# Patient Record
Sex: MALE | Race: WHITE
[De-identification: names, ages, dates, MRNs, and addresses within clinical notes are randomized per-mention and may not be internally consistent; named-entity substitution may affect disease eponyms.]

---

## 2021-03-21 ENCOUNTER — HOSPITAL ENCOUNTER (INPATIENT)
Dept: HOSPITAL 75 - ER | Age: 36
LOS: 2 days | Discharge: ANOTHER HOSPITAL | DRG: 871 | End: 2021-03-23
Attending: FAMILY MEDICINE | Admitting: INTERNAL MEDICINE
Payer: SELF-PAY

## 2021-03-21 VITALS — BODY MASS INDEX: 38.67 KG/M2 | WEIGHT: 276.24 LBS | HEIGHT: 70.87 IN

## 2021-03-21 DIAGNOSIS — F17.210: ICD-10-CM

## 2021-03-21 DIAGNOSIS — R65.21: ICD-10-CM

## 2021-03-21 DIAGNOSIS — R45.1: ICD-10-CM

## 2021-03-21 DIAGNOSIS — R53.83: ICD-10-CM

## 2021-03-21 DIAGNOSIS — J96.01: ICD-10-CM

## 2021-03-21 DIAGNOSIS — E87.5: ICD-10-CM

## 2021-03-21 DIAGNOSIS — K72.90: ICD-10-CM

## 2021-03-21 DIAGNOSIS — N17.9: ICD-10-CM

## 2021-03-21 DIAGNOSIS — A41.9: Primary | ICD-10-CM

## 2021-03-21 DIAGNOSIS — Z20.822: ICD-10-CM

## 2021-03-21 DIAGNOSIS — E87.2: ICD-10-CM

## 2021-03-21 DIAGNOSIS — I42.9: ICD-10-CM

## 2021-03-21 DIAGNOSIS — F15.90: ICD-10-CM

## 2021-03-21 DIAGNOSIS — F12.90: ICD-10-CM

## 2021-03-21 DIAGNOSIS — J18.9: ICD-10-CM

## 2021-03-21 LAB
ALBUMIN SERPL-MCNC: 2.9 GM/DL (ref 3.2–4.5)
ALP SERPL-CCNC: 127 U/L (ref 40–136)
ALT SERPL-CCNC: 248 U/L (ref 0–55)
APTT BLD: 29 SEC (ref 24–35)
ARTERIAL PATENCY WRIST A: (no result)
BASE EXCESS STD BLDA CALC-SCNC: -4.9 MMOL/L (ref -2.5–2.5)
BASOPHILS # BLD AUTO: 0.1 10^3/UL (ref 0–0.1)
BASOPHILS NFR BLD AUTO: 1 % (ref 0–10)
BDY SITE: (no result)
BILIRUB SERPL-MCNC: 1.2 MG/DL (ref 0.1–1)
BODY TEMPERATURE: 36.1
BUN/CREAT SERPL: 12
CALCIUM SERPL-MCNC: 8.3 MG/DL (ref 8.5–10.1)
CHLORIDE SERPL-SCNC: 104 MMOL/L (ref 98–107)
CO2 BLDA CALC-SCNC: 19 MMOL/L (ref 21–31)
CO2 SERPL-SCNC: 17 MMOL/L (ref 21–32)
CREAT SERPL-MCNC: 1.42 MG/DL (ref 0.6–1.3)
EOSINOPHIL # BLD AUTO: 0.2 10^3/UL (ref 0–0.3)
EOSINOPHIL NFR BLD AUTO: 2 % (ref 0–10)
GFR SERPLBLD BASED ON 1.73 SQ M-ARVRAT: 56 ML/MIN
GLUCOSE SERPL-MCNC: 115 MG/DL (ref 70–105)
HCT VFR BLD CALC: 43 % (ref 40–54)
HGB BLD-MCNC: 13.4 G/DL (ref 13.3–17.7)
INHALED O2 FLOW RATE: (no result) L/MIN
INR PPP: 1.3 (ref 0.8–1.4)
LYMPHOCYTES # BLD AUTO: 2.4 10^3/UL (ref 1–4)
LYMPHOCYTES NFR BLD AUTO: 24 % (ref 12–44)
MANUAL DIFFERENTIAL PERFORMED BLD QL: NO
MCH RBC QN AUTO: 29 PG (ref 25–34)
MCHC RBC AUTO-ENTMCNC: 31 G/DL (ref 32–36)
MCV RBC AUTO: 93 FL (ref 80–99)
MONOCYTES # BLD AUTO: 1.1 10^3/UL (ref 0–1)
MONOCYTES NFR BLD AUTO: 11 % (ref 0–12)
NEUTROPHILS # BLD AUTO: 6.3 10^3/UL (ref 1.8–7.8)
NEUTROPHILS NFR BLD AUTO: 62 % (ref 42–75)
PCO2 BLDA: 25 MMHG (ref 35–45)
PH BLDA: 7.48 [PH] (ref 7.37–7.43)
PLATELET # BLD: 323 10^3/UL (ref 130–400)
PMV BLD AUTO: 9.9 FL (ref 9–12.2)
PO2 BLDA: 70 MMHG (ref 79–93)
POTASSIUM SERPL-SCNC: 4.8 MMOL/L (ref 3.6–5)
PROT SERPL-MCNC: 6.2 GM/DL (ref 6.4–8.2)
PROTHROMBIN TIME: 16.2 SEC (ref 12.2–14.7)
SAO2 % BLDA FROM PO2: 94 % (ref 94–100)
SODIUM SERPL-SCNC: 134 MMOL/L (ref 135–145)
VENTILATION MODE VENT: NO
WBC # BLD AUTO: 10.1 10^3/UL (ref 4.3–11)

## 2021-03-21 PROCEDURE — 85027 COMPLETE CBC AUTOMATED: CPT

## 2021-03-21 PROCEDURE — 87205 SMEAR GRAM STAIN: CPT

## 2021-03-21 PROCEDURE — 84478 ASSAY OF TRIGLYCERIDES: CPT

## 2021-03-21 PROCEDURE — 82962 GLUCOSE BLOOD TEST: CPT

## 2021-03-21 PROCEDURE — 87591 N.GONORRHOEAE DNA AMP PROB: CPT

## 2021-03-21 PROCEDURE — 93306 TTE W/DOPPLER COMPLETE: CPT

## 2021-03-21 PROCEDURE — 76705 ECHO EXAM OF ABDOMEN: CPT

## 2021-03-21 PROCEDURE — 85730 THROMBOPLASTIN TIME PARTIAL: CPT

## 2021-03-21 PROCEDURE — 81000 URINALYSIS NONAUTO W/SCOPE: CPT

## 2021-03-21 PROCEDURE — 83880 ASSAY OF NATRIURETIC PEPTIDE: CPT

## 2021-03-21 PROCEDURE — 87040 BLOOD CULTURE FOR BACTERIA: CPT

## 2021-03-21 PROCEDURE — 85379 FIBRIN DEGRADATION QUANT: CPT

## 2021-03-21 PROCEDURE — 80074 ACUTE HEPATITIS PANEL: CPT

## 2021-03-21 PROCEDURE — 83605 ASSAY OF LACTIC ACID: CPT

## 2021-03-21 PROCEDURE — 82150 ASSAY OF AMYLASE: CPT

## 2021-03-21 PROCEDURE — 85025 COMPLETE CBC W/AUTO DIFF WBC: CPT

## 2021-03-21 PROCEDURE — 83690 ASSAY OF LIPASE: CPT

## 2021-03-21 PROCEDURE — 87081 CULTURE SCREEN ONLY: CPT

## 2021-03-21 PROCEDURE — 93005 ELECTROCARDIOGRAM TRACING: CPT

## 2021-03-21 PROCEDURE — 83735 ASSAY OF MAGNESIUM: CPT

## 2021-03-21 PROCEDURE — 87635 SARS-COV-2 COVID-19 AMP PRB: CPT

## 2021-03-21 PROCEDURE — 87491 CHLMYD TRACH DNA AMP PROBE: CPT

## 2021-03-21 PROCEDURE — 80048 BASIC METABOLIC PNL TOTAL CA: CPT

## 2021-03-21 PROCEDURE — 85007 BL SMEAR W/DIFF WBC COUNT: CPT

## 2021-03-21 PROCEDURE — 71275 CT ANGIOGRAPHY CHEST: CPT

## 2021-03-21 PROCEDURE — 84100 ASSAY OF PHOSPHORUS: CPT

## 2021-03-21 PROCEDURE — 82805 BLOOD GASES W/O2 SATURATION: CPT

## 2021-03-21 PROCEDURE — 84145 PROCALCITONIN (PCT): CPT

## 2021-03-21 PROCEDURE — 80076 HEPATIC FUNCTION PANEL: CPT

## 2021-03-21 PROCEDURE — 36415 COLL VENOUS BLD VENIPUNCTURE: CPT

## 2021-03-21 PROCEDURE — 94799 UNLISTED PULMONARY SVC/PX: CPT

## 2021-03-21 PROCEDURE — 87070 CULTURE OTHR SPECIMN AEROBIC: CPT

## 2021-03-21 PROCEDURE — 87088 URINE BACTERIA CULTURE: CPT

## 2021-03-21 PROCEDURE — 36600 WITHDRAWAL OF ARTERIAL BLOOD: CPT

## 2021-03-21 PROCEDURE — 80329 ANALGESICS NON-OPIOID 1 OR 2: CPT

## 2021-03-21 PROCEDURE — 85610 PROTHROMBIN TIME: CPT

## 2021-03-21 PROCEDURE — 80053 COMPREHEN METABOLIC PANEL: CPT

## 2021-03-21 PROCEDURE — 86703 HIV-1/HIV-2 1 RESULT ANTBDY: CPT

## 2021-03-21 PROCEDURE — 80306 DRUG TEST PRSMV INSTRMNT: CPT

## 2021-03-21 PROCEDURE — 71045 X-RAY EXAM CHEST 1 VIEW: CPT

## 2021-03-21 PROCEDURE — 86769 SARS-COV-2 COVID-19 ANTIBODY: CPT

## 2021-03-21 PROCEDURE — 94003 VENT MGMT INPAT SUBQ DAY: CPT

## 2021-03-21 PROCEDURE — 84484 ASSAY OF TROPONIN QUANT: CPT

## 2021-03-21 PROCEDURE — 82140 ASSAY OF AMMONIA: CPT

## 2021-03-21 PROCEDURE — 94640 AIRWAY INHALATION TREATMENT: CPT

## 2021-03-21 PROCEDURE — 87804 INFLUENZA ASSAY W/OPTIC: CPT

## 2021-03-21 PROCEDURE — 94002 VENT MGMT INPAT INIT DAY: CPT

## 2021-03-21 PROCEDURE — 84132 ASSAY OF SERUM POTASSIUM: CPT

## 2021-03-21 PROCEDURE — 82550 ASSAY OF CK (CPK): CPT

## 2021-03-21 RX ADMIN — SODIUM CHLORIDE SCH MLS/HR: 900 INJECTION, SOLUTION INTRAVENOUS at 23:03

## 2021-03-21 RX ADMIN — SODIUM CHLORIDE SCH MLS/HR: 900 INJECTION, SOLUTION INTRAVENOUS at 22:04

## 2021-03-21 NOTE — ED RESPIRATORY
General


Chief Complaint:  Respiratory Problems


Stated Complaint:  CHF / PAIN / SEVERE SOB


Source:  patient


Exam Limitations:  no limitations





History of Present Illness


Date Seen by Provider:  Mar 21, 2021


Time Seen by Provider:  19:20


Initial Comments


Patient presents ER by private conveyance with his father and chief complaint 

that he is having shortness of air.  He says last week he was having the 

symptoms started up and are only getting worse.  He went to [facility] last week and they told him he had some heart failure as well as was 

septic but he is not sure what from.  He has had a cough fever and chills 

subjectively but denies any nausea.  He has body aches.  He says he did a Covid 

swab last week and he thought it was negative.  He does not know about 

influenza.  He does use IV methamphetamines typically injecting in his left arm 

and says his last use was about 2 days ago.  He smokes about a pack and 1/2 to 2

packs of cigarettes per day.  No known medical history and does not follow with 

a physician nor does he take any medications routinely.  The only family history

he gives us is that his father has schizophrenia.





Allergies and Home Medications


Allergies


Coded Allergies:  


     No Known Drug Allergies (Unverified , 3/21/21)





Home Medications


Linezolid 600 Mg Tab, 600 MG PO BID, (Reported)


Tramadol Hcl 50 Mg Tab,  MG PO Q4H, (Reported)





Patient Home Medication List


Home Medication List Reviewed:  Yes





Review of Systems


Review of Systems


Constitutional:  chills, fever, malaise, weakness


EENTM:  No ear discharge, No ear pain


Respiratory:  cough, short of breath; No wheezing, No other


Cardiovascular:  No chest pain, No edema, No Hx of Intervention, No 

palpitations, No syncope


Gastrointestinal:  No abdominal pain, No constipation, No diarrhea, No nausea, 

No vomiting


Genitourinary:  No discharge, No dysuria


Musculoskeletal:  No back pain, No joint pain


Psychiatric/Neurological:  Denies Anxiety, Denies Depressed





All Other Systems Reviewed


Negative Unless Noted:  Yes





Past Medical-Social-Family Hx


Patient Social History


Drug of Choice:  Methamphetamines, THC


Smoking Status:  Current Everyday Smoker


Type Used:  Cigarettes (1.5-2 pack per day)





Physical Exam





Vital Signs - First Documented








 3/21/21 3/21/21





 19:15 20:00


 


Temp 36.1 


 


Pulse 112 


 


Resp 36 


 


B/P (MAP) 111/78 (89) 


 


Pulse Ox 98 


 


O2 Delivery Room Air 


 


O2 Flow Rate  3.00





Capillary Refill :


Height: '"


Weight: 210lbs. oz. 95.899223hp;  BMI


Method:Estimated


General Appearance:  WD/WN, moderate distress


Eyes:  Bilateral Eye Normal Inspection, Bilateral Eye PERRL, Bilateral Eye EOMI


HEENT:  PERRL/EOMI, pharynx normal


Neck:  full range of motion, normal inspection


Respiratory:  lungs clear, respiratory distress (Respiratory rate 35 to 40 

breaths/min), accessory muscle use; No crackles, No rales, No wheezing


Cardiovascular:  normal peripheral pulses, regular rate, rhythm, tachycardia


Gastrointestinal:  non tender, soft


Neurologic/Psychiatric:  alert, normal mood/affect, oriented x 3


Skin:  normal color, warm/dry





Focused Exam


Sepsis Stage:  Septic Shock


Possible Source:  Pulmonary


Lactate Level


3/21/21 21:22: Lactic Acid Level 5.32*H


3/21/21 23:00: Lactic Acid Level 2.30*H





Time of Focused Exam:  22:55


Respiratory:  Chest Non Tender, Accessory Muscle Use, Crackles, Respiratory 

Distress


Cardiovascular:  Regular Rate, Rhythm, Normal Peripheral Pulses


Capillary Refill:  Less Than 3 Seconds


Peripheral Pulses:  2+ Radial Pulses (R), 2+ Radial Pulses (L)


Skin:  normal color, diaphoresis


Lactic Acid Level





Laboratory Tests








Test


 3/21/21


21:22 3/21/21


23:00


 


Lactic Acid Level


 5.32 MMOL/L


(0.50-2.00)  *H 2.30 MMOL/L


(0.50-2.00)  *H








Within 3hrs of presentation:  Admin fluids (He is only received about one third 

of his IV fluids at this time.), Admin ABX, Blood cultures prior to ABX's, Focus

exam, Lactate level





Procedures/Interventions


Lumen:  triple


Central Line Procedure:  betadine prep (Chlorhexidine prep), sterile drapes 

applied, sterile dressing applied


Position:  internal jugular (R)


Anesthesia:  Lidocaine


Volume Anesthetic (ccs):  5


Complications:  none


Post Position:  sutured, good blood return, position confirmed w/ CXR


Risks, benefits and alternatives were discussed with the patient and he 

consented to the procedure. He was positioned in the usual format and using the 

usual sterile garment and drapes the patient was dressed out. The skin was 

thoroughly cleaned with the supplied chlorhexidine prep. After the prep and 

dried a sterile drape was placed. The 20 cm 7 Azeri triple-lumen catheter was 

flushed with sterile saline. We used ultrasound guidance to pass the introducer 

needle into the right internal jugular without difficulty. A guidewire was 

placed easily without difficulty. No ectopy was seen on the monitor. The 

supplied 11 blade scalpel was used to make a 2 mm incision at the inferior 

portion of the introducer needle. The introducer needle was replaced with the 

dilator. The dilator was taken out and the patient had the central lumen of the 

triple lumen catheter threaded over the guidewire and placed at 14 cm. The gu

idewire was removed and the triple-lumen catheter was stitched in place using 

the supplied braided stitch at 2 different points. The catheter withdrew blood 

and flushed easily. A sterile dressing was placed over the catheter. The patient

tolerated the procedure well. A chest x-ray was obtained that demonstrated no 

pneumothorax and a new interval central catheter over the shadow of the right 

internal jugular down the superior vena cava and terminating just proximal to 

the right atria.





Progress/Results/Core Measures


Suspected Sepsis


SIRS


Temperature: 


Pulse:  


Respiratory Rate: 


 


Laboratory Tests


3/21/21 19:42: White Blood Count 10.1


Blood Pressure  / 


Mean: 


 


3/21/21 21:22: Lactic Acid Level 5.32*H


3/21/21 23:00: Lactic Acid Level 2.30*H


Laboratory Tests


3/21/21 19:42: 


Creatinine 1.42H, Platelet Count 323, Total Bilirubin 1.2H


3/21/21 21:22: INR Comment 1.3








Results/Orders


Lab Results





Laboratory Tests








Test


 3/21/21


19:32 3/21/21


19:42 3/21/21


19:53 3/21/21


21:22 Range/Units


 


 


Blood Gas Puncture Site RIGHT RADIAL      


 


Blood Gas Patient Temperature 36.1      


 


Arterial Blood pH 7.48 H    7.37-7.43  


 


Arterial Blood Partial


Pressure CO2 25 L


 


 


 


 35-45  MMHG





 


Arterial Blood Partial


Pressure O2 70 L


 


 


 


 79-93  MMHG





 


Arterial Blood HCO3 18 L    23-27  MMOL/L


 


Arterial Blood Total CO2


 19.0 L


 


 


 


 21.0-31.0


MMOL/L


 


Arterial Blood Oxygen


Saturation 94 


 


 


 


   %





 


Arterial Blood Base Excess


 -4.9 L


 


 


 


 -2.5-2.5


MMOL/L


 


Rik Test YES-POS      


 


Blood Gas Ventilator Setting NO      


 


Blood Gas Inspired Oxygen ROOM AIR      


 


White Blood Count


 


 10.1 


 


 


 4.3-11.0


10^3/uL


 


Red Blood Count


 


 4.65 


 


 


 4.30-5.52


10^6/uL


 


Hemoglobin  13.4    13.3-17.7  g/dL


 


Hematocrit  43    40-54  %


 


Mean Corpuscular Volume  93    80-99  fL


 


Mean Corpuscular Hemoglobin  29    25-34  pg


 


Mean Corpuscular Hemoglobin


Concent 


 31 L


 


 


 32-36  g/dL





 


Red Cell Distribution Width  14.6 H   10.0-14.5  %


 


Platelet Count


 


 323 


 


 


 130-400


10^3/uL


 


Mean Platelet Volume  9.9    9.0-12.2  fL


 


Immature Granulocyte % (Auto)  0     %


 


Neutrophils (%) (Auto)  62    42-75  %


 


Lymphocytes (%) (Auto)  24    12-44  %


 


Monocytes (%) (Auto)  11    0-12  %


 


Eosinophils (%) (Auto)  2    0-10  %


 


Basophils (%) (Auto)  1    0-10  %


 


Neutrophils # (Auto)


 


 6.3 


 


 


 1.8-7.8


10^3/uL


 


Lymphocytes # (Auto)


 


 2.4 


 


 


 1.0-4.0


10^3/uL


 


Monocytes # (Auto)


 


 1.1 H


 


 


 0.0-1.0


10^3/uL


 


Eosinophils # (Auto)


 


 0.2 


 


 


 0.0-0.3


10^3/uL


 


Basophils # (Auto)


 


 0.1 


 


 


 0.0-0.1


10^3/uL


 


Immature Granulocyte # (Auto)


 


 0.0 


 


 


 0.0-0.1


10^3/uL


 


Sodium Level  134 L   135-145  MMOL/L


 


Potassium Level  4.8    3.6-5.0  MMOL/L


 


Chloride Level  104      MMOL/L


 


Carbon Dioxide Level  17 L   21-32  MMOL/L


 


Anion Gap  13    5-14  MMOL/L


 


Blood Urea Nitrogen  17    7-18  MG/DL


 


Creatinine


 


 1.42 H


 


 


 0.60-1.30


MG/DL


 


Estimat Glomerular Filtration


Rate 


 56 


 


 


  





 


BUN/Creatinine Ratio  12     


 


Glucose Level  115 H     MG/DL


 


Calcium Level  8.3 L   8.5-10.1  MG/DL


 


Corrected Calcium  9.2    8.5-10.1  MG/DL


 


Total Bilirubin  1.2 H   0.1-1.0  MG/DL


 


Aspartate Amino Transf


(AST/SGOT) 


 141 H


 


 


 5-34  U/L





 


Alanine Aminotransferase


(ALT/SGPT) 


 248 H


 


 


 0-55  U/L





 


Alkaline Phosphatase  127      U/L


 


Troponin I  < 0.028    <0.028  NG/ML


 


Total Protein  6.2 L   6.4-8.2  GM/DL


 


Albumin  2.9 L   3.2-4.5  GM/DL


 


Coronavirus 2019 (GEORGI)   Negative   Negative  


 


Prothrombin Time    16.2 H 12.2-14.7  SEC


 


INR Comment    1.3  0.8-1.4  


 


Activated Partial


Thromboplast Time 


 


 


 29 


 24-35  SEC





 


D-Dimer


 


 


 


 3.97 H


 0.00-0.49


UG/ML


 


Lactic Acid Level


 


 


 


 5.32 *H


 0.50-2.00


MMOL/L


 


Test


 3/21/21


23:00 


 


 


 Range/Units


 


 


Lactic Acid Level


 2.30 *H


 


 


 


 0.50-2.00


MMOL/L








Micro Results





Microbiology


3/21/21 Influenza Types A,B Antigen (NNEKA) - Final, Complete


          





My Orders





Orders - SUKH WEISS


Continuous Ekg Monitoring (3/21/21 19:17)


Ekg Tracing (3/21/21 19:17)


Lorazepam Injection (Ativan Injection) (3/21/21 19:45)


Cbc With Automated Diff (3/21/21 19:38)


Comprehensive Metabolic Panel (3/21/21 19:38)


Blood Culture (3/21/21 19:38)


Sputum Culture (3/21/21 19:38)


Urinalysis (3/21/21 19:38)


Urine Culture (3/21/21 19:38)


Protime With Inr (3/21/21 19:38)


Partial Thromboplastin Time (3/21/21 19:38)


Chest 1 View, Ap/Pa Only (3/21/21 19:38)


Ed Iv/Invasive Line Start (3/21/21 19:38)


Ed Iv/Invasive Line Start (3/21/21 19:38)


Troponin I (3/21/21 19:38)


Vital Signs Adult Sepsis Patie Q15M (3/21/21 19:38)


O2 (3/21/21 19:38)


Remove Rings In Anticipation O (3/21/21 19:38)


Lactic Acid Analyzer (3/21/21 19:38)


Influenza A And B Antigens (3/21/21 19:38)


Lactated Ringers (Lr 1000 Ml Iv Solution (3/21/21 19:45)


Piperacillin Sodium/Tazobactam (Zosyn Vi (3/21/21 19:45)


Vancomycin Injection (Vancomycin Injecti (3/21/21 19:45)


Covid 19 Inhouse Test (3/21/21 19:38)


Coronavirus Sars-Cov-2 So  (3/21/21 19:38)


Arterial Blood Gas (3/21/21 19:38)


Drug Screen Stat (Urine) (3/21/21 19:43)


Fibrin Degradation Products (3/21/21 21:03)


Lorazepam Injection (Ativan Injection) (3/21/21 21:45)


Lactated Ringers (Lr 1000 Ml Iv Solution (3/21/21 21:42)


Chest 1 View, Ap/Pa Only (3/21/21 22:46)


Ct Angio Chest W (3/21/21 22:58)


Covid-19 External Lab Results (3/21/21 22:58)





Medications Given in ED





Current Medications








 Medications  Dose


 Ordered  Sig/Reuben


 Route  Start Time


 Stop Time Status Last Admin


Dose Admin


 


 Lactated Ringer's  1,000 ml @ 


 0 mls/hr  Q0M ONCE


 IV  3/21/21 19:45


 3/21/21 19:46 DC 3/21/21 22:52


1,000 MLS/HR


 


 Lorazepam  1 mg  ONCE  ONCE


 IVP  3/21/21 19:45


 3/21/21 19:46 DC 3/21/21 19:46


1 MG


 


 Lorazepam  1 mg  ONCE  ONCE


 IVP  3/21/21 21:45


 3/21/21 21:46 DC 3/21/21 22:10


1 MG


 


 Piperacillin Sod/


 Tazobactam Sod


 4.5 gm/Sodium


 Chloride  100 ml @ 


 200 mls/hr  ONCE  ONCE


 IV  3/21/21 19:45


 3/21/21 20:14 DC 3/21/21 21:31


200 MLS/HR








Vital Signs/I&O











 3/21/21 3/21/21





 19:15 20:00


 


Temp 36.1 


 


Pulse 112 


 


Resp 36 


 


B/P (MAP) 111/78 (89) 


 


Pulse Ox 98 98


 


O2 Delivery Room Air 


 


O2 Flow Rate  3.00














 3/21/21





 23:59


 


Intake Total 100 ml


 


Balance 100 ml





Capillary Refill :


Progress Note #1:  


   Time:  19:54


Progress Note


Because of stated fevers and tachycardia as well as tachypnea we will do a 

septic work-up.  We are going to cautiously give a liter of fluids at first and 

reassess before we give him a second liter of fluid since he states he was told 

he had heart failure last week.  Plan to get another Covid swab and influenza 

swab.  BNP is elevated indicating increased left ventricular end diastolic 

dilatation which may be seen in CHF. The ABG demonstrates he has significant 

hypoxia despite his tachypnea and tachycardia so we put oxygen by nasal cannula 

on and will titrate to the patient's comfort.  We went ahead and gave him a 

milligram of Ativan as he was quite excited probably owing to his 

hyperventilatory, hypocarbia state as well as possible methamphetamine.  Patient

states he wants to get treatment today.  We are going to start with Zosyn and 

vancomycin 2 g.


Progress Note #2:  


   Time:  21:44


Progress Note


Multi lobar pneumonia.  No evidence of heart failure.  Would like to get a CT 

angiogram however after multiple attempts we have not been able to get a decent 

IV that would support this.  D-dimer is still pending and may rule out the need 

for this however it will also likely be elevated since he has pneumonia.  Broad-

spectrum antibiotics were already initiated.  Plan to put him up in the 

hospital.  He got some significant relief from the Ativan but were can give him 

another milligram as he is needing some more at this time.





ECG


Initial ECG Impression Date:  Mar 21, 2021


Initial ECG Impression Time:  19:25


Initial ECG Rate:  114


Initial ECG Rhythm:  S.Tach


Initial ECG Intervals:  QT (469)


Initial ECG Impression:  Normal, Nonspecific Changes


Comment


Sinus tachycardia with borderline prolonged QTc interval of 469 ms.  No 

clinically relevant ST elevation or depression.





Diagnostic Imaging





   Diagonstic Imaging:  Xray


   Plain Films/CT/US/NM/MRI:  chest


Comments


                 [facility] [place], [place]





NAME:   JAIRO DUNCAN


MED REC#:   [id number]


ACCOUNT#:   [id number]


PT STATUS:   REG ER


:   1985


PHYSICIAN:   SUKH WEISS MD


ADMIT DATE:   21/ER


                                   ***Draft***


Date of Exam:21





CHEST 1 VIEW, AP/PA ONLY








INDICATION: Sepsis.  





TECHNIQUE: Single view chest 9:12 PM.





CORRELATION STUDY: None.





FINDINGS: Heart size enlarged. Vasculature appears to be likely


within normal limits.  


Bilateral pulmonary opacities are noted particularly in the mid


and lower lung fields, right greater than left. Findings most


pronounced at the right lung base. There is slight relative


sparing of the upper lobes.





IMPRESSION: 


1. Bilateral pulmonary opacities favoring multilobe pneumonia.


Most pronounced over the right lung base.


2. There is cardiac enlargement. Vasculature is somewhat obscured


but does not appear to be overly distended.





  Dictated on workstation # [id number]








Dict:   21


Trans:   21


[facility] [id number]





Interpreted by:     CEDRICK BEAUCHAMP DO


Electronically signed by:


   Reviewed:  Reviewed by Me








   Diagonstic Imaging:  Xray


   Plain Films/CT/US/NM/MRI:  chest (Post central cath)


Comments


Bilateral patchy infiltrates.


Interval central catheter with the distal tip terminating in the SVC at the 

level of the right atria.  No evidence of pneumothorax.


   Reviewed:  Reviewed by Me








   Diagonstic Imaging:  CT


   Plain Films/CT/US/NM/MRI:  chest


Comments


CT angiography of the chest with contrast demonstrates no pulmonary embolism.


Multifocal bilateral airspace opacities most concerning for multifocal inf

ection.  Alveolar edema also on the differential.


Small right pleural effusion.


Marked cardiomegaly.


Trace ascites in the upper abdomen.


   Reviewed:  Reviewed by Me





Critical Care Note


Critical Care


Start Time:  19:20


Stop Time:  22:50


Total Time (minutes)


180 mins


Progress


I attest that 180 minutes excluding procedure time of critical care was 

performed at bedside reassuring the patient, treating his septic shock, 

coordinating care with the different ancillary teams, nursing, radiology etc. as

well as obtaining history and managing the patient's symptoms.





Departure


Communication (Admissions)


Time/Spoke to Admitting Phy:  22:50


Discussed the case with Dr. Verdin and he agrees to get a CT angiogram on the 

way up.  He does not want the patient to be a PUI at this time.  He agrees with 

broad-spectrum antibiotics and ICU placement.





Impression





   Primary Impression:  


   Pneumonia


   Qualified Codes:  J18.9 - Pneumonia, unspecified organism


   Additional Impressions:  


   Sepsis


   Qualified Codes:  A41.9 - Sepsis, unspecified organism; R65.20 - Severe 

   sepsis without septic shock; J96.01 - Acute respiratory failure with hypoxia


   Acute respiratory failure with hypoxemia


Disposition:   ADMITTED AS INPATIENT


Condition:  Stable





Admissions


Decision to Admit Reason:  Admit from ER (General)


Decision to Admit/Date:  Mar 21, 2021


Time/Decision to Admit Time:  21:00





Departure-Patient Inst.


Referrals:  


NO,LOCAL PHYSICIAN (PCP/Family)


Primary Care Physician











SUKH WEISS                 Mar 21, 2021 19:56

## 2021-03-21 NOTE — DIAGNOSTIC IMAGING REPORT
INDICATION: Sepsis.  



TECHNIQUE: Single view chest 9:12 PM.



CORRELATION STUDY: None.



FINDINGS: Heart size enlarged. Vasculature appears to be likely

within normal limits.  

Bilateral pulmonary opacities are noted particularly in the mid

and lower lung fields, right greater than left. Findings most

pronounced at the right lung base. There is slight relative

sparing of the upper lobes.



IMPRESSION: 

1. Bilateral pulmonary opacities favoring multilobe pneumonia.

Most pronounced over the right lung base.

2. There is cardiac enlargement. Vasculature is somewhat obscured

but does not appear to be overly distended.



Dictated by: 



  Dictated on workstation # [id number]

## 2021-03-22 VITALS — DIASTOLIC BLOOD PRESSURE: 55 MMHG | SYSTOLIC BLOOD PRESSURE: 89 MMHG

## 2021-03-22 VITALS — DIASTOLIC BLOOD PRESSURE: 26 MMHG | SYSTOLIC BLOOD PRESSURE: 124 MMHG

## 2021-03-22 VITALS — SYSTOLIC BLOOD PRESSURE: 93 MMHG | DIASTOLIC BLOOD PRESSURE: 62 MMHG

## 2021-03-22 VITALS — SYSTOLIC BLOOD PRESSURE: 111 MMHG | DIASTOLIC BLOOD PRESSURE: 78 MMHG

## 2021-03-22 VITALS — SYSTOLIC BLOOD PRESSURE: 98 MMHG | DIASTOLIC BLOOD PRESSURE: 72 MMHG

## 2021-03-22 LAB
ALBUMIN SERPL-MCNC: 2.7 GM/DL (ref 3.2–4.5)
ALBUMIN SERPL-MCNC: 2.7 GM/DL (ref 3.2–4.5)
ALBUMIN SERPL-MCNC: 2.9 GM/DL (ref 3.2–4.5)
ALP SERPL-CCNC: 141 U/L (ref 40–136)
ALP SERPL-CCNC: 170 U/L (ref 40–136)
ALP SERPL-CCNC: 172 U/L (ref 40–136)
ALT SERPL-CCNC: 1516 U/L (ref 0–55)
ALT SERPL-CCNC: 285 U/L (ref 0–55)
ALT SERPL-CCNC: 3545 U/L (ref 0–55)
AMMONIA PLAS-SCNC: 234 UMOL/L (ref 11–32)
AMYLASE SERPL-CCNC: 54 U/L (ref 25–125)
APAP SERPL-MCNC: < 10 UG/ML (ref 10–30)
APTT PPP: YELLOW S
ARTERIAL PATENCY WRIST A: (no result)
BACTERIA #/AREA URNS HPF: NEGATIVE /HPF
BARBITURATES UR QL: NEGATIVE
BASE EXCESS STD BLDA CALC-SCNC: -7.6 MMOL/L (ref -2.5–2.5)
BASOPHILS # BLD AUTO: 0 10^3/UL (ref 0–0.1)
BASOPHILS # BLD AUTO: 0.1 10^3/UL (ref 0–0.1)
BASOPHILS # BLD AUTO: 0.1 10^3/UL (ref 0–0.1)
BASOPHILS NFR BLD AUTO: 0 % (ref 0–10)
BASOPHILS NFR BLD AUTO: 0 % (ref 0–10)
BASOPHILS NFR BLD AUTO: 1 % (ref 0–10)
BDY SITE: (no result)
BENZODIAZ UR QL SCN: POSITIVE
BILIRUB DIRECT SERPL-MCNC: 1.1 MG/DL (ref 0–0.3)
BILIRUB SERPL-MCNC: 1.8 MG/DL (ref 0.1–1)
BILIRUB SERPL-MCNC: 3.6 MG/DL (ref 0.1–1)
BILIRUB SERPL-MCNC: 4.1 MG/DL (ref 0.1–1)
BILIRUB UR QL STRIP: NEGATIVE
BODY TEMPERATURE: 37
BUN/CREAT SERPL: 13
BUN/CREAT SERPL: 14
BURR CELLS/RBC NFR CSF MANUAL: (no result) %
CALCIUM SERPL-MCNC: 7.1 MG/DL (ref 8.5–10.1)
CALCIUM SERPL-MCNC: 7.4 MG/DL (ref 8.5–10.1)
CALCIUM SERPL-MCNC: 7.4 MG/DL (ref 8.5–10.1)
CALCIUM SERPL-MCNC: 8.1 MG/DL (ref 8.5–10.1)
CHLORIDE SERPL-SCNC: 101 MMOL/L (ref 98–107)
CHLORIDE SERPL-SCNC: 102 MMOL/L (ref 98–107)
CHLORIDE SERPL-SCNC: 103 MMOL/L (ref 98–107)
CHLORIDE SERPL-SCNC: 104 MMOL/L (ref 98–107)
CO2 BLDA CALC-SCNC: 18.5 MMOL/L (ref 21–31)
CO2 SERPL-SCNC: 15 MMOL/L (ref 21–32)
CO2 SERPL-SCNC: 16 MMOL/L (ref 21–32)
CO2 SERPL-SCNC: 17 MMOL/L (ref 21–32)
CO2 SERPL-SCNC: 19 MMOL/L (ref 21–32)
COCAINE UR QL: NEGATIVE
CREAT SERPL-MCNC: 1.41 MG/DL (ref 0.6–1.3)
CREAT SERPL-MCNC: 1.87 MG/DL (ref 0.6–1.3)
CREAT SERPL-MCNC: 2.17 MG/DL (ref 0.6–1.3)
CREAT SERPL-MCNC: 2.25 MG/DL (ref 0.6–1.3)
EOSINOPHIL # BLD AUTO: 0 10^3/UL (ref 0–0.3)
EOSINOPHIL NFR BLD AUTO: 0 % (ref 0–10)
FIBRINOGEN PPP-MCNC: (no result) MG/DL
GFR SERPLBLD BASED ON 1.73 SQ M-ARVRAT: 33 ML/MIN
GFR SERPLBLD BASED ON 1.73 SQ M-ARVRAT: 35 ML/MIN
GFR SERPLBLD BASED ON 1.73 SQ M-ARVRAT: 41 ML/MIN
GFR SERPLBLD BASED ON 1.73 SQ M-ARVRAT: 57 ML/MIN
GLUCOSE SERPL-MCNC: 165 MG/DL (ref 70–105)
GLUCOSE SERPL-MCNC: 21 MG/DL (ref 70–105)
GLUCOSE SERPL-MCNC: 83 MG/DL (ref 70–105)
GLUCOSE SERPL-MCNC: 84 MG/DL (ref 70–105)
GLUCOSE UR STRIP-MCNC: NEGATIVE MG/DL
HCT VFR BLD CALC: 41 % (ref 40–54)
HCT VFR BLD CALC: 44 % (ref 40–54)
HCT VFR BLD CALC: 45 % (ref 40–54)
HGB BLD-MCNC: 12.8 G/DL (ref 13.3–17.7)
HGB BLD-MCNC: 13.6 G/DL (ref 13.3–17.7)
HGB BLD-MCNC: 13.9 G/DL (ref 13.3–17.7)
INHALED O2 FLOW RATE: (no result) L/MIN
KETONES UR QL STRIP: NEGATIVE
LEUKOCYTE ESTERASE UR QL STRIP: NEGATIVE
LIPASE SERPL-CCNC: 37 U/L (ref 8–78)
LYMPHOCYTES # BLD AUTO: 1.2 X 10^3 (ref 1–4)
LYMPHOCYTES # BLD AUTO: 1.3 10^3/UL (ref 1–4)
LYMPHOCYTES # BLD AUTO: 1.8 10^3/UL (ref 1–4)
LYMPHOCYTES NFR BLD AUTO: 16 % (ref 12–44)
LYMPHOCYTES NFR BLD AUTO: 6 % (ref 12–44)
LYMPHOCYTES NFR BLD AUTO: 7 % (ref 12–44)
MAGNESIUM SERPL-MCNC: 1.8 MG/DL (ref 1.6–2.4)
MAGNESIUM SERPL-MCNC: 1.8 MG/DL (ref 1.6–2.4)
MAGNESIUM SERPL-MCNC: 1.9 MG/DL (ref 1.6–2.4)
MANUAL DIFFERENTIAL PERFORMED BLD QL: NO
MANUAL DIFFERENTIAL PERFORMED BLD QL: NO
MANUAL DIFFERENTIAL PERFORMED BLD QL: YES
MCH RBC QN AUTO: 28 PG (ref 25–34)
MCH RBC QN AUTO: 29 PG (ref 25–34)
MCH RBC QN AUTO: 29 PG (ref 25–34)
MCHC RBC AUTO-ENTMCNC: 31 G/DL (ref 32–36)
MCHC RBC AUTO-ENTMCNC: 31 G/DL (ref 32–36)
MCHC RBC AUTO-ENTMCNC: 32 G/DL (ref 32–36)
MCV RBC AUTO: 90 FL (ref 80–99)
MCV RBC AUTO: 92 FL (ref 80–99)
MCV RBC AUTO: 92 FL (ref 80–99)
METHADONE UR QL SCN: NEGATIVE
METHAMPHETAMINE SCREEN URINE S: POSITIVE
MONOCYTES # BLD AUTO: 1.1 10^3/UL (ref 0–1)
MONOCYTES # BLD AUTO: 1.7 X 10^3 (ref 0–1)
MONOCYTES # BLD AUTO: 2.1 10^3/UL (ref 0–1)
MONOCYTES NFR BLD AUTO: 10 % (ref 0–12)
MONOCYTES NFR BLD AUTO: 10 % (ref 0–12)
MONOCYTES NFR BLD AUTO: 11 % (ref 0–12)
MONOCYTES NFR BLD: 10 %
NEUTROPHILS # BLD AUTO: 13.5 X 10^3 (ref 1.8–7.8)
NEUTROPHILS # BLD AUTO: 17.6 10^3/UL (ref 1.8–7.8)
NEUTROPHILS # BLD AUTO: 7.9 10^3/UL (ref 1.8–7.8)
NEUTROPHILS NFR BLD AUTO: 72 % (ref 42–75)
NEUTROPHILS NFR BLD AUTO: 81 % (ref 42–75)
NEUTROPHILS NFR BLD AUTO: 83 % (ref 42–75)
NEUTS BAND NFR BLD MANUAL: 82 %
NEUTS BAND NFR BLD: 4 %
NITRITE UR QL STRIP: NEGATIVE
OPIATES UR QL SCN: NEGATIVE
OTHER ELEMENTS URNS MICRO: (no result) /HPF
OXYCODONE UR QL: NEGATIVE
PCO2 BLDA: 35 MMHG (ref 35–45)
PH BLDA: 7.32 [PH] (ref 7.37–7.43)
PH UR STRIP: 6 [PH] (ref 5–9)
PHOSPHATE SERPL-MCNC: 4.2 MG/DL (ref 2.3–4.7)
PHOSPHATE SERPL-MCNC: 5.5 MG/DL (ref 2.3–4.7)
PHOSPHATE SERPL-MCNC: 5.5 MG/DL (ref 2.3–4.7)
PLATELET # BLD: 332 10^3/UL (ref 130–400)
PLATELET # BLD: 347 10^3/UL (ref 130–400)
PLATELET # BLD: 350 10^3/UL (ref 130–400)
PMV BLD AUTO: 9.4 FL (ref 9–12.2)
PMV BLD AUTO: 9.6 FL (ref 9–12.2)
PMV BLD AUTO: 9.7 FL (ref 9–12.2)
PO2 BLDA: 70 MMHG (ref 79–93)
POTASSIUM SERPL-SCNC: 5.4 MMOL/L (ref 3.6–5)
POTASSIUM SERPL-SCNC: 6.7 MMOL/L (ref 3.6–5)
POTASSIUM SERPL-SCNC: 6.8 MMOL/L (ref 3.6–5)
POTASSIUM SERPL-SCNC: 6.9 MMOL/L (ref 3.6–5)
POTASSIUM SERPL-SCNC: 7.3 MMOL/L (ref 3.6–5)
PROPOXYPH UR QL: NEGATIVE
PROT SERPL-MCNC: 5.7 GM/DL (ref 6.4–8.2)
PROT SERPL-MCNC: 5.9 GM/DL (ref 6.4–8.2)
PROT SERPL-MCNC: 6 GM/DL (ref 6.4–8.2)
PROT UR QL STRIP: (no result)
RBC #/AREA URNS HPF: (no result) /HPF
SAO2 % BLDA FROM PO2: 89 % (ref 94–100)
SODIUM SERPL-SCNC: 131 MMOL/L (ref 135–145)
SODIUM SERPL-SCNC: 133 MMOL/L (ref 135–145)
SODIUM SERPL-SCNC: 133 MMOL/L (ref 135–145)
SODIUM SERPL-SCNC: 134 MMOL/L (ref 135–145)
SP GR UR STRIP: 1.01 (ref 1.02–1.02)
SQUAMOUS #/AREA URNS HPF: (no result) /HPF
TRICYCLICS UR QL SCN: NEGATIVE
VARIANT LYMPHS NFR BLD MANUAL: 4 %
VENTILATION MODE VENT: YES
WBC # BLD AUTO: 10.9 10^3/UL (ref 4.3–11)
WBC # BLD AUTO: 16.5 10^3/UL (ref 4.3–11)
WBC # BLD AUTO: 21.3 10^3/UL (ref 4.3–11)
WBC #/AREA URNS HPF: (no result) /HPF

## 2021-03-22 PROCEDURE — 0BH17EZ INSERTION OF ENDOTRACHEAL AIRWAY INTO TRACHEA, VIA NATURAL OR ARTIFICIAL OPENING: ICD-10-PCS | Performed by: FAMILY MEDICINE

## 2021-03-22 PROCEDURE — 02HV33Z INSERTION OF INFUSION DEVICE INTO SUPERIOR VENA CAVA, PERCUTANEOUS APPROACH: ICD-10-PCS | Performed by: FAMILY MEDICINE

## 2021-03-22 PROCEDURE — 5A1945Z RESPIRATORY VENTILATION, 24-96 CONSECUTIVE HOURS: ICD-10-PCS | Performed by: FAMILY MEDICINE

## 2021-03-22 RX ADMIN — PROPOFOL SCH MLS/HR: 10 INJECTION, EMULSION INTRAVENOUS at 08:09

## 2021-03-22 RX ADMIN — SODIUM CHLORIDE, SODIUM LACTATE, POTASSIUM CHLORIDE, AND CALCIUM CHLORIDE SCH MLS/HR: 600; 310; 30; 20 INJECTION, SOLUTION INTRAVENOUS at 02:00

## 2021-03-22 RX ADMIN — SODIUM CHLORIDE SCH MLS/HR: 900 INJECTION, SOLUTION INTRAVENOUS at 09:56

## 2021-03-22 RX ADMIN — ALBUTEROL SULFATE SCH GM: 90 AEROSOL, METERED RESPIRATORY (INHALATION) at 18:17

## 2021-03-22 RX ADMIN — VASOPRESSIN SCH MLS/HR: 20 INJECTION INTRAVENOUS at 09:57

## 2021-03-22 RX ADMIN — LORAZEPAM PRN MG: 2 INJECTION INTRAMUSCULAR; INTRAVENOUS at 03:25

## 2021-03-22 RX ADMIN — SODIUM CHLORIDE, SODIUM LACTATE, POTASSIUM CHLORIDE, AND CALCIUM CHLORIDE SCH MLS/HR: 600; 310; 30; 20 INJECTION, SOLUTION INTRAVENOUS at 21:22

## 2021-03-22 RX ADMIN — DEXMEDETOMIDINE HYDROCHLORIDE SCH MLS/HR: 100 INJECTION, SOLUTION, CONCENTRATE INTRAVENOUS at 20:11

## 2021-03-22 RX ADMIN — PROPOFOL SCH MLS/HR: 10 INJECTION, EMULSION INTRAVENOUS at 14:41

## 2021-03-22 RX ADMIN — SODIUM CHLORIDE SCH MLS/HR: 4.5 INJECTION, SOLUTION INTRAVENOUS at 18:49

## 2021-03-22 RX ADMIN — ALBUTEROL SULFATE SCH GM: 90 AEROSOL, METERED RESPIRATORY (INHALATION) at 08:29

## 2021-03-22 RX ADMIN — SODIUM CHLORIDE, SODIUM LACTATE, POTASSIUM CHLORIDE, AND CALCIUM CHLORIDE SCH MLS/HR: 600; 310; 30; 20 INJECTION, SOLUTION INTRAVENOUS at 08:39

## 2021-03-22 RX ADMIN — SODIUM CHLORIDE SCH MLS/HR: 900 INJECTION, SOLUTION INTRAVENOUS at 02:18

## 2021-03-22 RX ADMIN — VANCOMYCIN HYDROCHLORIDE SCH MLS/HR: 500 INJECTION, POWDER, LYOPHILIZED, FOR SOLUTION INTRAVENOUS at 14:38

## 2021-03-22 RX ADMIN — VASOPRESSIN SCH MLS/HR: 20 INJECTION INTRAVENOUS at 16:55

## 2021-03-22 RX ADMIN — Medication SCH MLS/HR: at 20:19

## 2021-03-22 RX ADMIN — SODIUM CHLORIDE, SODIUM LACTATE, POTASSIUM CHLORIDE, AND CALCIUM CHLORIDE SCH MLS/HR: 600; 310; 30; 20 INJECTION, SOLUTION INTRAVENOUS at 14:38

## 2021-03-22 RX ADMIN — Medication SCH MLS/HR: at 18:50

## 2021-03-22 RX ADMIN — LORAZEPAM PRN MG: 2 INJECTION INTRAMUSCULAR; INTRAVENOUS at 08:40

## 2021-03-22 RX ADMIN — VASOPRESSIN SCH MLS/HR: 20 INJECTION INTRAVENOUS at 19:09

## 2021-03-22 RX ADMIN — VANCOMYCIN HYDROCHLORIDE SCH MLS/HR: 500 INJECTION, POWDER, LYOPHILIZED, FOR SOLUTION INTRAVENOUS at 08:39

## 2021-03-22 RX ADMIN — Medication SCH MLS/HR: at 07:31

## 2021-03-22 RX ADMIN — PROPOFOL SCH MLS/HR: 10 INJECTION, EMULSION INTRAVENOUS at 18:48

## 2021-03-22 RX ADMIN — Medication SCH MLS/HR: at 02:00

## 2021-03-22 RX ADMIN — SODIUM CHLORIDE SCH MLS/HR: 900 INJECTION, SOLUTION INTRAVENOUS at 18:49

## 2021-03-22 RX ADMIN — LINEZOLID SCH MLS/HR: 600 INJECTION, SOLUTION INTRAVENOUS at 21:22

## 2021-03-22 RX ADMIN — VASOPRESSIN SCH MLS/HR: 20 INJECTION INTRAVENOUS at 02:00

## 2021-03-22 RX ADMIN — DEXMEDETOMIDINE HYDROCHLORIDE SCH MLS/HR: 100 INJECTION, SOLUTION, CONCENTRATE INTRAVENOUS at 14:37

## 2021-03-22 RX ADMIN — PROPOFOL SCH MLS/HR: 10 INJECTION, EMULSION INTRAVENOUS at 23:27

## 2021-03-22 NOTE — DIAGNOSTIC IMAGING REPORT
EXAMINATION: CT angiography of the chest.



TECHNIQUE: Contrast enhanced thin section helical images were

obtained through the chest with intravenous contrast timed for

the optimal opacification of the arterial structures per CTA

protocol. Post-processing, reconstructions and interpretation of

angiographic images of the vessels was performed. 3D MIP

reconstructions were performed and reviewed. All CT scans use one

or more of the following dose optimizing techniques: automated

exposure control, MA and/or KvP adjustment based on a patient

size and exam type, or iterative reconstruction. 



HISTORY: Shortness of breath



COMPARISON: None available.



FINDINGS: 



There is no pulmonary embolism.



There is bilateral peribronchial vascular thickening with areas

of consolidation and ground glass in both lungs which are

moderate in severity. There is a small right pleural effusion. No

pneumothorax. No suspicious nodules.



There is no axillary or supraclavicular lymphadenopathy. Mild

mediastinal lymphadenopathy is likely reactive. Right-sided

central venous catheter is present through an internal jugular

approach. There is   moderate severe dilation of the left

ventricle. There are no coronary artery calcifications.  No

pericardial effusion. Aorta is normal in caliber.



Limited views of the upper abdomen show small volume ascites.



There are no suspicious osseus lesions.



IMPRESSION:



1. No pulmonary embolism.





2. Moderate severe dilation left ventricle with small right

pleural effusion and airspace disease in a pattern most

suggestive of moderate pulmonary edema. Pneumonia is considered

less likely.



Dictated by: 



  Dictated on workstation # [id number]

## 2021-03-22 NOTE — DIAGNOSTIC IMAGING REPORT
EXAM: CHEST 1 VIEW, AP/PA ONLY



INDICATION: Line placement.



COMPARISON: CTA chest 03/21/2021. Chest radiograph 03/22/2021 at

6:38 AM.



FINDINGS: NGT tube in the right mainstem bronchus and right lower

lobe. This was immediately repositioned on subsequent chest

radiograph. ETT tip at the level of clavicles. Right IJ CVC tip

mid SVC. Cardiomegaly. Diffuse airspace opacities throughout both

lungs.



IMPRESSION: 

1. Malpositioned NG tube was repositioned on subsequent

radiograph.

2. ETT in expected position.

3. Dense airspace opacities throughout both lungs.



Dictated by: 



  Dictated on workstation # [id number]

## 2021-03-22 NOTE — DIAGNOSTIC IMAGING REPORT
EXAM: CHEST 1 VIEW, AP/PA ONLY



INDICATION: Line placement.



COMPARISON: Chest radiograph 03/22/2021 at 6:42 AM.



FINDINGS: ETT tip at the level clavicles. NG tube tip below the

field-of-view. Right IJ CVC tip mid SVC. Advanced airspace

opacities throughout both lungs. No pleural effusion or

pneumothorax is seen. Cardiomegaly. Low lung findings.



IMPRESSION: 

1. Interval repositioning of the NG tube tip which is now the

level of the field-of-view and below the diaphragm.

2. Persistent advanced airspace opacities throughout both lungs.



Dictated by: 



  Dictated on workstation # [id number]

## 2021-03-22 NOTE — PULMONARY CONSULTATION
History of Present Illness


History of Present Illness


Date Seen by Provider:  Mar 22, 2021


Time Seen by Provider:  05:27


Date of Admission








Allergies and Home Medications


Allergies


Coded Allergies:  


     No Known Drug Allergies (Unverified , 3/21/21)





Home Medications


Linezolid 600 Mg Tab, 600 MG PO BID, (Reported)


Tramadol Hcl 50 Mg Tab,  MG PO Q4H, (Reported)





Past Medical-Social-Family Hx


Patient Social History


Alcohol Use:  Occasionally Uses


Drug of Choice:  Methamphetamines, THC


Smoking Status:  Heavy Tobacco Smoker


Type Used:  Cigarettes


Recent Infectious Disease Expo:  No


Have you traveled recently?:  No


Substance type:  Amphetamines, Methamphetamine


Alcohol Use?:  Unable to obtain





Past Medical History


Surgeries:  Yes (arthroscopy of left knee, abscess of left arm)


Respiratory:  No


Cardiac:  No


Neurological:  No


Psychosocial:  Yes (substance abuse)





Review of Systems


Time Seen by Provider:  05:27





Sepsis Event


Evaluation


Height, Weight, BMI


Height: '"


Weight: 210lbs. oz. 95.025979py; 33.70 BMI


Method:Estimated





Exam


Exam





Vital Signs








  Date Time  Temp Pulse Resp B/P (MAP) Pulse Ox O2 Delivery O2 Flow Rate FiO2


 


3/22/21 05:00  102 33 96/57 (70) 97 Nasal Cannula 5.00 


 


3/22/21 04:00  101 38 100/73 (80) 89 Nasal Cannula 5.00 


 


3/22/21 03:00  103 17 97/74 (80) 100 Nasal Cannula 5.00 


 


3/22/21 02:00  98 29 137/52 (60) 100 Nasal Cannula 5.00 


 


3/22/21 02:00  96  102/66    


 


3/22/21 01:45  97 23 102/66 (74) 97 Nasal Cannula 5.00 


 


3/22/21 01:30  102 36 82/53 (63) 100 Nasal Cannula 5.00 


 


3/22/21 01:16  107 21 91/65 (76) 93 Nasal Cannula 5.00 


 


3/22/21 01:00  107 28 79/59 (67) 96 Nasal Cannula 5.00 


 


3/22/21 00:45  108 29 104/80 (90) 96 Nasal Cannula 5.00 


 


3/22/21 00:40      Nasal Cannula 5.00 96


 


3/22/21 00:36 36.1 112   98   21


 


3/22/21 00:30  106 25 93/70 (80) 94 Nasal Cannula 5.00 


 


3/22/21 00:15  105  90/40 (64) 95 Nasal Cannula 5.00 


 


3/22/21 00:14 35.3 109 28 84/41 (55) 94 Nasal Cannula 5.00 


 


3/22/21 00:09  106      


 


3/21/21 23:00  114 26 111/77 99 Nasal Cannula  


 


3/21/21 20:00     98  3.00 


 


3/21/21 19:15 36.1 112 36 111/78 (89) 98 Room Air  














I & O 


 


 3/22/21





 07:00


 


Intake Total 3600 ml


 


Balance 3600 ml








Height & Weight


Height: '"


Weight: 210lbs. oz. 95.772809li; 33.70 BMI


Method:Estimated


Respiratory:  Chest Non Tender, Accessory Muscle Use, Crackles, Respiratory 

Distress


Cardiovascular:  Regular Rate, Rhythm, Normal Peripheral Pulses


Capillary Refill:  Less Than 3 Seconds


Peripheral Pulses:  2+ Radial Pulses (R), 2+ Radial Pulses (L)


Gastrointestinal:  non tender, soft





Results


Lab


Laboratory Tests


3/21/21 19:42








3/22/21 01:25











Assessment/Plan


Assessment/Plan


Acute respiratory failure 


   -Rapid COVID is negative 


   -COVID PCR is pending 


Acute PNA with sepsis and hypoxia 


   -Oxygen 


   -Zosyn and vanco pending 


   -LR at 150ml/hr for now 


   -Pan cultures pending 


Lethargy with confusion and agitation 


   -Start Precedex gtt 


   -ABG on admission reviewed   


Hypotension 


   -Monitor 


Hx of methamphetamine use 


   -UDS is positive 


Maijuanna use 


Acute renal failure with Metabolic acidosis and hyperkalemia 


   -Give 2amps of Bicarb


   -repeat CMP at 1300 


   -IVF 


   -Monitor 


   -


Agitation 


   -S.p Haldol and Ativan 


Hx of cardiomyopaty per pt 


   -Check Echo 


Liver failure 


   -monitor 


   -Check hepatitis panel 








UPDATE: called back to pt's room secondary to increased agitation trying to 

climb out of bed. 4 nurses at bedside holding pt down. Pt will not follow any 

commands however is very agitated. He has increased WOB with accessory muscule 

use.











LEONARDA THOMPSON DO              Mar 22, 2021 05:32

## 2021-03-22 NOTE — HISTORY & PHYSICAL-HOSPITALIST
History of Present Illness


HPI/Chief Complaint


Pt is a 35yoCM with a PMH of reported heart failure, COVID 2 months ago who 

presented to the ER due to SOB. He is currently intubated and unable to provide 

any history. All history is obtained from the records. He reportedly has been at

multiple hospitals for this and left AMA. He was d told her had evidence of 

heart failure and was also septic at that time though source is unclear. He 

reported a cough, fever, and chills to the ER. He believes he had a negative 

COVID last week. His dad reported that he actually had COVID about 2 months ago.

Pt reported to the ER that he does use IV meth and last use it 2 days ago.


Source:  patient


Date Seen


3/22/21


Time Seen by a Provider:  07:30


Attending Physician


Julia Epps MD


PCP


No,Local Physician


Referring Physician





Date of Admission


Mar 21, 2021 at 23:00





Home Medications & Allergies


Home Medications


Reviewed patient Home Medication Reconciliation performed by pharmacy medication

reconciliations technician and/or nursing.


Patients Allergies have been reviewed.





Allergies





Allergies


Coded Allergies


  No Known Drug Allergies (Unverified3/21/21)








Past Medical-Social-Family Hx


Past Med/Social Hx:  Reviewed Nursing Past Med/Soc Hx


Patient Social History


Alcohol Use:  Occasionally Uses


Recreational Drug Use:  Yes


Drug of Choice:  Methamphetamines, THC


Smoking Status:  Heavy Tobacco Smoker


Type Used:  Cigarettes


Recent Foreign Travel:  No


Contact w/other who traveled:  No


Recent Infectious Disease Expo:  No





Family History


Reviewed Nursing Family Hx





Father- schizophrenia





Review of Systems


Constitutional:  see HPI





Physical Exam


Physical Exam


Vital Signs





Vital Signs - First Documented








 3/21/21 3/21/21 3/22/21





 19:15 20:00 00:36


 


Temp 36.1  


 


Pulse 112  


 


Resp 36  


 


B/P (MAP) 111/78 (89)  


 


Pulse Ox 98  


 


O2 Delivery Room Air  


 


O2 Flow Rate  3.00 


 


FiO2   21





Capillary Refill : Less Than 3 Seconds


Height, Weight, BMI


Height: '"


Weight: 210lbs. oz. 95.717119zn; 33.70 BMI


Method:Estimated


General Appearance:  Obese, Other (intubated and sedated)


HEENT:  Moist Mucous Membranes, Other (EET and OGT in place)


Neck:  Normal Inspection, Supple


Respiratory:  Lungs Clear, Other (intubated)


Cardiovascular:  Tachycardia (regular rhythm)


Gastrointestinal:  Normal Bowel Sounds, Non Tender, Soft


Genital/Rectal:  Other (davis inplace)


Extremity:  Normal Capillary Refill, No Calf Tenderness, No Pedal Edema


Neurologic/Psychiatric:  No Other (sedate, appears comfortable)





Results


Results/Procedures


Labs


Laboratory Tests


3/22/21 17:15








3/22/21 19:00








3/22/21 21:54








3/23/21 02:05








3/23/21 06:20








3/23/21 09:47








Patient resulted labs reviewed.


Imaging:  Reviewed Imaging Report


Imaging


                 [facility] [place], [place]





NAME:   JAIRO DUNCAN


MED REC#:   [id number]


ACCOUNT#:   [id number]


PT STATUS:   ADM IN


:   1985


PHYSICIAN:   SUKH WEISS MD


ADMIT DATE:   21/ICU


                                  ***Signed***


Date of Exam:21





CT ANGIO CHEST W








EXAMINATION: CT angiography of the chest.





TECHNIQUE: Contrast enhanced thin section helical images were


obtained through the chest with intravenous contrast timed for


the optimal opacification of the arterial structures per CTA


protocol. Post-processing, reconstructions and interpretation of


angiographic images of the vessels was performed. 3D MIP


reconstructions were performed and reviewed. All CT scans use one


or more of the following dose optimizing techniques: automated


exposure control, MA and/or KvP adjustment based on a patient


size and exam type, or iterative reconstruction. 





HISTORY: Shortness of breath





COMPARISON: None available.





FINDINGS: 





There is no pulmonary embolism.





There is bilateral peribronchial vascular thickening with areas


of consolidation and ground glass in both lungs which are


moderate in severity. There is a small right pleural effusion. No


pneumothorax. No suspicious nodules.





There is no axillary or supraclavicular lymphadenopathy. Mild


mediastinal lymphadenopathy is likely reactive. Right-sided


central venous catheter is present through an internal jugular


approach. There is   moderate severe dilation of the left


ventricle. There are no coronary artery calcifications.  No


pericardial effusion. Aorta is normal in caliber.





Limited views of the upper abdomen show small volume ascites.





There are no suspicious osseus lesions.





IMPRESSION:





1. No pulmonary embolism.








2. Moderate severe dilation left ventricle with small right


pleural effusion and airspace disease in a pattern most


suggestive of moderate pulmonary edema. Pneumonia is considered


less likely.





Dictated by: 





  Dictated on workstation # [id number]








Dict:   21 [id number]


Trans:   21 1159


YOSELYN [id number]





Interpreted by:     HUGO CASTELLON MD


Electronically signed by: HUGO CASTELLON MD [id number]








                 [facility].


                                [place], [place]





NAME:   JAIRO DUNCAN


MED REC#:   [id number]


ACCOUNT#:   [id number]


PT STATUS:   REG ER


:   1985


PHYSICIAN:   SUKH WEISS MD


ADMIT DATE:   21/ER


                                  ***Signed***


Date of Exam:21





CHEST 1 VIEW, AP/PA ONLY








INDICATION: Sepsis.  





TECHNIQUE: Single view chest 9:12 PM.





CORRELATION STUDY: None.





FINDINGS: Heart size enlarged. Vasculature appears to be likely


within normal limits.  


Bilateral pulmonary opacities are noted particularly in the mid


and lower lung fields, right greater than left. Findings most


pronounced at the right lung base. There is slight relative


sparing of the upper lobes.





IMPRESSION: 


1. Bilateral pulmonary opacities favoring multilobe pneumonia.


Most pronounced over the right lung base.


2. There is cardiac enlargement. Vasculature is somewhat obscured


but does not appear to be overly distended.





Dictated by: 





  Dictated on workstation # [id number]








Dict:   21


Trans:   21


[facility] [id number]





Interpreted by:     CEDRICK BEAUCHAMP DO


Electronically signed by: CEDRICK BEAUCHAMP DO 21





Assessment/Plan


Admission Diagnosis


Septic Shock


Admission Status:  Inpatient Order (span 2 midnights)


Reason for Inpatient Admission:  


see below





Assessment and Plan


Septic Shock


Pneumonia


Acute respiratory failure


   Continue on IV abx


   Progressed to needing intubation this morning


   Pulm consulted, appreciate recs


   Lactic acid 5.32 on arrival down to 2.43 this morning


   





Transaminitis


IV meth use


   hepatitis panel ordered


   HIV ordered


   when off vent will discuss cessation





ADWOA


   Creatinine 1.4


   Continue IVF





Diagnosis/Problems


Diagnosis/Problems





(1) ADWOA (acute kidney injury)


Status:  Acute


(2) Liver failure


Qualifiers:  


   Liver failure chronicity:  acute  Hepatic coma status:  with hepatic coma  

Qualified Codes:  K72.01 - Acute and subacute hepatic failure with coma


(3) Sepsis


Status:  Acute


Qualifiers:  


   Sepsis type:  sepsis due to unspecified organism  Sepsis acute organ 

dysfunction status:  with acute organ dysfunction  Severe sepsis acute organ 

dysfunction type:  acute respiratory failure  Acute respiratory failure type:  

with hypoxia  Severe sepsis shock status:  with septic shock  Qualified Codes:  

A41.9 - Sepsis, unspecified organism; R65.21 - Severe sepsis with septic shock; 

J96.01 - Acute respiratory failure with hypoxia


(4) Pneumonia


Status:  Acute


Qualifiers:  


   Pneumonia type:  due to unspecified organism  Laterality:  bilateral  Lung 

location:  unspecified part of lung  Qualified Codes:  J18.9 - Pneumonia, unspe

cified organism


(5) Acute respiratory failure with hypoxemia


Status:  Acute


(6) Drug abuse


Status:  Acute











JULIA EPPS MD              Mar 22, 2021 10:12

## 2021-03-22 NOTE — PULMONARY PROCEDURES
Pulmonary Procedures


Date of Procedure


Date of Service:  Mar 22, 2021


Reason for Intubation:  respiratory failure/agitation/respiratory distress


Time of Intubation:  06:31


Intubation Method:  orotracheal


Tube Size:  8


Medications:  Propofol, Rocuronium, Versed


Positive End Tide CO2:  Yes


Breath Sounds after Intubation:  bilateral-equal


Intubation Complications:  no complications


Post Intubation Xray:  Yes











LEONARDA THOMPSON DO              Mar 22, 2021 06:31

## 2021-03-23 VITALS — DIASTOLIC BLOOD PRESSURE: 81 MMHG | SYSTOLIC BLOOD PRESSURE: 115 MMHG

## 2021-03-23 VITALS — DIASTOLIC BLOOD PRESSURE: 79 MMHG | SYSTOLIC BLOOD PRESSURE: 111 MMHG

## 2021-03-23 VITALS — SYSTOLIC BLOOD PRESSURE: 114 MMHG | DIASTOLIC BLOOD PRESSURE: 83 MMHG

## 2021-03-23 VITALS — DIASTOLIC BLOOD PRESSURE: 68 MMHG | SYSTOLIC BLOOD PRESSURE: 100 MMHG

## 2021-03-23 VITALS — SYSTOLIC BLOOD PRESSURE: 122 MMHG | DIASTOLIC BLOOD PRESSURE: 91 MMHG

## 2021-03-23 VITALS — DIASTOLIC BLOOD PRESSURE: 82 MMHG | SYSTOLIC BLOOD PRESSURE: 112 MMHG

## 2021-03-23 LAB
ALBUMIN SERPL-MCNC: 2.4 GM/DL (ref 3.2–4.5)
ALP SERPL-CCNC: 175 U/L (ref 40–136)
ALT SERPL-CCNC: 5440 U/L (ref 0–55)
ARTERIAL PATENCY WRIST A: (no result)
BASE EXCESS STD BLDA CALC-SCNC: -0.1 MMOL/L (ref -2.5–2.5)
BASOPHILS # BLD AUTO: 0 10^3/UL (ref 0–0.1)
BASOPHILS NFR BLD AUTO: 0 % (ref 0–10)
BDY SITE: (no result)
BILIRUB SERPL-MCNC: 3.5 MG/DL (ref 0.1–1)
BODY TEMPERATURE: 37.7
BUN/CREAT SERPL: 10
BUN/CREAT SERPL: 11
BUN/CREAT SERPL: 11
CALCIUM SERPL-MCNC: 6.6 MG/DL (ref 8.5–10.1)
CALCIUM SERPL-MCNC: 6.6 MG/DL (ref 8.5–10.1)
CALCIUM SERPL-MCNC: 6.8 MG/DL (ref 8.5–10.1)
CHLORIDE SERPL-SCNC: 100 MMOL/L (ref 98–107)
CHLORIDE SERPL-SCNC: 101 MMOL/L (ref 98–107)
CHLORIDE SERPL-SCNC: 101 MMOL/L (ref 98–107)
CO2 BLDA CALC-SCNC: 26.6 MMOL/L (ref 21–31)
CO2 SERPL-SCNC: 19 MMOL/L (ref 21–32)
CO2 SERPL-SCNC: 24 MMOL/L (ref 21–32)
CO2 SERPL-SCNC: 25 MMOL/L (ref 21–32)
CREAT SERPL-MCNC: 2.56 MG/DL (ref 0.6–1.3)
CREAT SERPL-MCNC: 2.82 MG/DL (ref 0.6–1.3)
CREAT SERPL-MCNC: 3.21 MG/DL (ref 0.6–1.3)
EOSINOPHIL # BLD AUTO: 0 10^3/UL (ref 0–0.3)
EOSINOPHIL NFR BLD AUTO: 0 % (ref 0–10)
GFR SERPLBLD BASED ON 1.73 SQ M-ARVRAT: 22 ML/MIN
GFR SERPLBLD BASED ON 1.73 SQ M-ARVRAT: 26 ML/MIN
GFR SERPLBLD BASED ON 1.73 SQ M-ARVRAT: 29 ML/MIN
GLUCOSE SERPL-MCNC: 128 MG/DL (ref 70–105)
GLUCOSE SERPL-MCNC: 170 MG/DL (ref 70–105)
GLUCOSE SERPL-MCNC: 183 MG/DL (ref 70–105)
HCT VFR BLD CALC: 45 % (ref 40–54)
HGB BLD-MCNC: 13.9 G/DL (ref 13.3–17.7)
INHALED O2 FLOW RATE: (no result) L/MIN
LYMPHOCYTES # BLD AUTO: 1.9 10^3/UL (ref 1–4)
LYMPHOCYTES NFR BLD AUTO: 12 % (ref 12–44)
MAGNESIUM SERPL-MCNC: 1.9 MG/DL (ref 1.6–2.4)
MANUAL DIFFERENTIAL PERFORMED BLD QL: NO
MCH RBC QN AUTO: 29 PG (ref 25–34)
MCHC RBC AUTO-ENTMCNC: 31 G/DL (ref 32–36)
MCV RBC AUTO: 92 FL (ref 80–99)
MONOCYTES # BLD AUTO: 2 10^3/UL (ref 0–1)
MONOCYTES NFR BLD AUTO: 12 % (ref 0–12)
NEUTROPHILS # BLD AUTO: 11.8 10^3/UL (ref 1.8–7.8)
NEUTROPHILS NFR BLD AUTO: 75 % (ref 42–75)
PCO2 BLDA: 51 MMHG (ref 35–45)
PH BLDA: 7.32 [PH] (ref 7.37–7.43)
PHOSPHATE SERPL-MCNC: 5.6 MG/DL (ref 2.3–4.7)
PLATELET # BLD: 275 10^3/UL (ref 130–400)
PMV BLD AUTO: 9.8 FL (ref 9–12.2)
PO2 BLDA: 35 MMHG (ref 79–93)
POTASSIUM SERPL-SCNC: 5.7 MMOL/L (ref 3.6–5)
POTASSIUM SERPL-SCNC: 5.9 MMOL/L (ref 3.6–5)
POTASSIUM SERPL-SCNC: 7 MMOL/L (ref 3.6–5)
PROT SERPL-MCNC: 5.3 GM/DL (ref 6.4–8.2)
SAO2 % BLDA FROM PO2: 43 % (ref 94–100)
SODIUM SERPL-SCNC: 135 MMOL/L (ref 135–145)
SODIUM SERPL-SCNC: 137 MMOL/L (ref 135–145)
SODIUM SERPL-SCNC: 139 MMOL/L (ref 135–145)
VENTILATION MODE VENT: YES
WBC # BLD AUTO: 15.8 10^3/UL (ref 4.3–11)

## 2021-03-23 RX ADMIN — SODIUM CHLORIDE, SODIUM LACTATE, POTASSIUM CHLORIDE, AND CALCIUM CHLORIDE SCH MLS/HR: 600; 310; 30; 20 INJECTION, SOLUTION INTRAVENOUS at 04:08

## 2021-03-23 RX ADMIN — SODIUM CHLORIDE SCH MLS/HR: 4.5 INJECTION, SOLUTION INTRAVENOUS at 13:54

## 2021-03-23 RX ADMIN — Medication SCH GM: at 06:30

## 2021-03-23 RX ADMIN — SODIUM CHLORIDE SCH MLS/HR: 4.5 INJECTION, SOLUTION INTRAVENOUS at 07:23

## 2021-03-23 RX ADMIN — PROPOFOL SCH MLS/HR: 10 INJECTION, EMULSION INTRAVENOUS at 13:32

## 2021-03-23 RX ADMIN — VASOPRESSIN SCH MLS/HR: 20 INJECTION INTRAVENOUS at 12:16

## 2021-03-23 RX ADMIN — SODIUM CHLORIDE, SODIUM LACTATE, POTASSIUM CHLORIDE, AND CALCIUM CHLORIDE SCH MLS/HR: 600; 310; 30; 20 INJECTION, SOLUTION INTRAVENOUS at 10:08

## 2021-03-23 RX ADMIN — SODIUM CHLORIDE SCH MLS/HR: 900 INJECTION, SOLUTION INTRAVENOUS at 03:00

## 2021-03-23 RX ADMIN — LINEZOLID SCH MLS/HR: 600 INJECTION, SOLUTION INTRAVENOUS at 08:00

## 2021-03-23 RX ADMIN — DEXMEDETOMIDINE HYDROCHLORIDE SCH MLS/HR: 100 INJECTION, SOLUTION, CONCENTRATE INTRAVENOUS at 08:59

## 2021-03-23 RX ADMIN — SODIUM CHLORIDE SCH MLS/HR: 4.5 INJECTION, SOLUTION INTRAVENOUS at 00:12

## 2021-03-23 RX ADMIN — ACETYLCYSTEINE SCH ML: 200 INHALANT RESPIRATORY (INHALATION) at 07:00

## 2021-03-23 RX ADMIN — PROPOFOL SCH MLS/HR: 10 INJECTION, EMULSION INTRAVENOUS at 05:04

## 2021-03-23 RX ADMIN — DEXMEDETOMIDINE HYDROCHLORIDE SCH MLS/HR: 100 INJECTION, SOLUTION, CONCENTRATE INTRAVENOUS at 13:32

## 2021-03-23 RX ADMIN — ACETYLCYSTEINE SCH ML: 200 INHALANT RESPIRATORY (INHALATION) at 01:19

## 2021-03-23 RX ADMIN — SODIUM CHLORIDE SCH MLS/HR: 900 INJECTION, SOLUTION INTRAVENOUS at 08:00

## 2021-03-23 RX ADMIN — ALBUTEROL SULFATE SCH PUFF: 90 AEROSOL, METERED RESPIRATORY (INHALATION) at 07:00

## 2021-03-23 RX ADMIN — Medication SCH GM: at 12:36

## 2021-03-23 RX ADMIN — Medication SCH MLS/HR: at 06:31

## 2021-03-23 RX ADMIN — Medication SCH GM: at 00:12

## 2021-03-23 RX ADMIN — DEXMEDETOMIDINE HYDROCHLORIDE SCH MLS/HR: 100 INJECTION, SOLUTION, CONCENTRATE INTRAVENOUS at 03:10

## 2021-03-23 RX ADMIN — VASOPRESSIN SCH MLS/HR: 20 INJECTION INTRAVENOUS at 03:07

## 2021-03-23 RX ADMIN — PROPOFOL SCH MLS/HR: 10 INJECTION, EMULSION INTRAVENOUS at 09:52

## 2021-03-23 NOTE — DIAGNOSTIC IMAGING REPORT
INDICATION: Sepsis



COMPARISON: 03/22/2021



FINDINGS: Single view of the chest demonstrates low lung volumes

with continued bilateral pulmonary infiltrates. The heart is

enlarged. There is no pneumothorax. No large effusion seen.

Support devices appear stable.



IMPRESSION: Unchanged aeration of the lungs. No pneumothorax.



Dictated by: 



  Dictated on workstation # [id number]

## 2021-03-23 NOTE — DISCHARGE SUMMARY
Diagnosis/Chief Complaint


Date of Admission


Mar 21, 2021 at 23:00


Date of Discharge





Admission Diagnosis


Septic Shock


Primary Care


No,Local Physician





Discharge Summary


Discharge Physical Exam


Allergies:  


Coded Allergies:  


     No Known Drug Allergies (Unverified , 3/21/21)


Vitals & I&Os





Vital Signs








  Date Time  Temp Pulse Resp B/P (MAP) Pulse Ox O2 Delivery O2 Flow Rate FiO2


 


3/23/21 13:32  116  114/83    


 


3/23/21 13:00   24  100 Mechanical Ventilator 60.00 


 


3/23/21 12:00 37.7       


 


3/23/21 12:00        98








General Appearance:  Other (ill young man, sedated and intubated)


Respiratory:  Decreased Breath Sounds, Other (on vent)


Cardiovascular:  Tachycardia


Gastrointestinal:  Normal Bowel Sounds, Soft





Hospital Course


Pt was admitted to the hospital due to septic shock from presumed pneumonia. He 

decompensated on the evening of admission and was intubated. He continued to 

progress to multi organ failure despite broad spectrum antibiotics, IV fluid 

resuscitation, and ventilation. He had significant liver failure and Hepatits C 

antibody was positive. HIV was negative as well as COVID PCR and antibody tests.

He progressed with his renal failure and hyperkalemia and transfer to tertiary 

center was initiated. He did have a UDS that was positive for methamphetamines 

and his father reported he was addicted to meth though was unsure of how much he

used or for how long. He was ultimately transferred to [facility] in critical 

condition. Air transfer was attempted but due to weather unavailable so he was 

transferred by ground.


Labs (last 24 hrs)





Microbiology


3/22/21 Gram Stain - Final, Resulted


          


3/22/21 Sputum Culture - Preliminary, Resulted


          Usual upper respiratory librado


3/22/21 Urine Culture - Final, Complete


          NO GROWTH


3/21/21 Blood Culture - Preliminary, Resulted


          No growth


Patient resulted labs reviewed.


Pending Labs





Imaging:  Reviewed Imaging Report





Discussion & Recommendations


Discharge Planning:  >30 minutes discharge planning





Discharge


Home Medications:





Active Scripts


Active


Active Prescriptions or Reported Medications Unobtainable    





Instructions to patient/family


Please see electronic discharge instructions given to patient.











JULIA SUTTON MD              Mar 23, 2021 08:22

## 2021-03-23 NOTE — PULMONARY PROGRESS NOTE
Subjective


Time Seen by a Provider:  05:50





Sepsis Event


Evaluation


Height, Weight, BMI


Height: '"


Weight: 210lbs. oz. 95.695425bm; 33.70 BMI


Method:Estimated





Focused Exam


Lactate Level


3/22/21 21:07: Lactic Acid Level 5.44*H


3/22/21 22:06: Lactic Acid Level 4.95*H


3/23/21 02:57: Lactic Acid Level 4.61*H


Time of Focused Exam:  22:55


Lactic Acid Level





Laboratory Tests








Test


 3/23/21


02:57


 


Lactic Acid Level


 4.61 MMOL/L


(0.50-2.00)  *H











Exam


Exam





Vital Signs








  Date Time  Temp Pulse Resp B/P (MAP) Pulse Ox O2 Delivery O2 Flow Rate FiO2


 


3/23/21 05:04  126  126/85    


 


3/23/21 05:00  125 26 126/85 (99) 93 Mechanical Ventilator 30.00 


 


3/23/21 04:20  117 25  92   30


 


3/23/21 04:10  117 25  91   30


 


3/23/21 04:00  122 25  93   30


 


3/23/21 04:00  122 26 115/81 (92) 94 Mechanical Ventilator 30.00 


 


3/23/21 03:10  124      


 


3/23/21 03:07  123  89/39    


 


3/23/21 03:00  118 23 106/68 (81) 96 Mechanical Ventilator 30.00 


 


3/23/21 02:00  121 23 95/69 (78) 93 Mechanical Ventilator 30.00 


 


3/23/21 01:00  122      


 


3/23/21 01:00  122 24 111/82 (92) 96 Mechanical Ventilator 30.00 


 


3/23/21 00:56  123 24  94   30


 


3/23/21 00:00      Mechanical Ventilator 30.00 97


 


3/23/21 00:00  109 23 80/51 (61) 92 Mechanical Ventilator 30.00 


 


3/22/21 23:33 37.8       


 


3/22/21 23:27  120  107/82    


 


3/22/21 23:00  104 23 108/91 (97) 88 Mechanical Ventilator 30.00 


 


3/22/21 22:00  107 24 94/61 (72) 96 Mechanical Ventilator 30.00 


 


3/22/21 21:33 38.4       


 


3/22/21 21:17  105 24  95   30


 


3/22/21 21:00  106 24 90/64 (73) 95 Mechanical Ventilator 30.00 


 


3/22/21 20:41 38.4       


 


3/22/21 20:19  101  93/63    


 


3/22/21 20:11  101  91/60    


 


3/22/21 20:03 37.5       


 


3/22/21 20:00      Mechanical Ventilator 30.00 97


 


3/22/21 20:00  109 26 91/60 (70) 96 Mechanical Ventilator 30.00 


 


3/22/21 19:09  101  104/61    


 


3/22/21 19:00  102 34 104/61 (75) 93 Mechanical Ventilator 30.00 


 


3/22/21 19:00  102      


 


3/22/21 18:50  111  99/69    


 


3/22/21 18:48  111  99/69    


 


3/22/21 18:17  110 28  97   30


 


3/22/21 18:00  109 28 105/68 (80) 96 Mechanical Ventilator 30.00 


 


3/22/21 17:11      Mechanical Ventilator 30.00 


 


3/22/21 17:00  107 26 100/61 (74) 98 Mechanical Ventilator 35.00 


 


3/22/21 16:00  102 29 87/51 (63) 98 Mechanical Ventilator 35.00 


 


3/22/21 16:00      Mechanical Ventilator 40.00 97


 


3/22/21 15:00  101 34 86/55 (65) 95 Mechanical Ventilator 35.00 


 


3/22/21 14:41      Mechanical Ventilator 35.00 


 


3/22/21 14:41  101  86/47    


 


3/22/21 14:37  101      


 


3/22/21 14:34  101 34  94   35


 


3/22/21 14:00  101 35 131/50 (77) 97 Mechanical Ventilator 40.00 


 


3/22/21 13:00  101 35 82/50 (61) 98 Mechanical Ventilator 40.00 


 


3/22/21 12:30  99      


 


3/22/21 12:00  101 35 110/46 (67) 99 Mechanical Ventilator 40.00 


 


3/22/21 11:13      Mechanical Ventilator 40.00 97


 


3/22/21 11:00  104 33 97/67 (77) 100 Mechanical Ventilator 40.00 


 


3/22/21 10:33      Mechanical Ventilator 40.00 


 


3/22/21 10:28  103 30  100   40


 


3/22/21 10:00  103 28 92/62 (72) 99 Mechanical Ventilator 45.00 


 


3/22/21 09:00  100 27 81/50 (60) 97 Mechanical Ventilator 45.00 


 


3/22/21 08:30     97   50


 


3/22/21 08:09  109  85/55    


 


3/22/21 08:00      Mechanical Ventilator 40.00 97


 


3/22/21 08:00  102 42 84/68 (73) 93 Mechanical Ventilator 50.00 


 


3/22/21 07:35      Mechanical Ventilator 50.00 


 


3/22/21 07:31    81/45    


 


3/22/21 07:01  98 27  91   40


 


3/22/21 07:00  100 24 98/62 (74) 92 Mechanical Ventilator 100.00 


 


3/22/21 06:46  105      


 


3/22/21 06:40      Mechanical Ventilator 100.00 


 


3/22/21 06:15  116 41 106/78 (87) 94 Nasal Cannula 5.00 














I & O 


 


 3/23/21





 07:00


 


Intake Total 5980 ml


 


Output Total 410 ml


 


Balance 5570 ml








Height & Weight


Height: '"


Weight: 210lbs. oz. 95.745198ty; 33.70 BMI


Method:Estimated


General Appearance:  Obese, Other (intubated and sedated)


HEENT:  Moist Mucous Membranes, Other (EET and OGT in place)


Neck:  Normal Inspection, Supple


Respiratory:  Lungs Clear, Other (intubated)


Cardiovascular:  Tachycardia (regular rhythm)


Capillary Refill:  Less Than 3 Seconds


Peripheral Pulses:  2+ Radial Pulses (R), 2+ Radial Pulses (L)


Gastrointestinal:  non tender, soft


Extremity:  Normal Capillary Refill, No Calf Tenderness, No Pedal Edema


Neurologic/Psychiatric:  No Other (sedate, appears comfortable)





Results


Lab


Laboratory Tests


3/21/21 19:42








3/22/21 01:25








3/22/21 13:40








3/22/21 13:45








3/22/21 17:15








3/22/21 19:00








3/22/21 21:54








3/23/21 02:05











Assessment/Plan


Assessment/Plan


Acute respiratory failure 


   -Rapid COVID is negative 


   -COVID PCR is pending 


Acute PNA with sepsis and hypoxia 


   -Oxygen 


   -Zosyn and vanco pending 


   -LR at 150ml/hr for now 


   -Pan cultures pending 


Multiorgan failure - probably secondary to methamphetamine use 


   -Will try to transfer to . 


Lethargy with confusion and agitation 


   -Start Precedex gtt 


   -ABG on admission reviewed   


Hypotension 


   -Monitor 


Hx of methamphetamine use 


   -UDS is positive 


[place] use 


Acute renal failure with Metabolic acidosis and hyperkalemia -- worsening 


   -Continue Bicarb gtt 


   -Repeat Calcium gluconate 


   -Repeat labs pending 


   -Eicu managed pt through the night 


   -Oliguria 


   -Transfer for HD 





Agitation 


   -S.p Haldol and Ativan 


Hx of cardiomyopaty per pt 


   -Check Echo 


Liver failure - Shock liver 


   -monitor 


   -Check hepatitis panel 





I discussed with pt's father and explained pt's current status and answered all 

questions to the best of my ability. He agrees with [facility] transfer. Pt's prognosis 

is poor secondary to mulitorgan failure. 








UpDate: 0634 Discussed with [facility] and they will get back with us on acceptance.











LEONARDA THOMPSON DO              Mar 23, 2021 05:55

## 2021-03-23 NOTE — DIAGNOSTIC IMAGING REPORT
PROCEDURE: 

US Hepatic (Liver).



TECHNIQUE: 

Multiple Real-time grayscale images were obtained over the right

upper quadrant in various projections.



INDICATION: 

Septic shock and elevated liver function tests as well as

polysubstance abuse.



FINDINGS:

The study is limited due to patient body habitus and difficulty

positioning the patient. The liver is normal in size at 15.8 cm.

The portal vein is patent and shows normal direction of flow. No

discrete liver mass is identified. There is some perihepatic

ascites present. The gallbladder wall is thickened at 6 mm. No

definite stones are seen. There is no biliary ductal dilatation.

The pancreas was obscured. The proximal aorta is visualized and

appears nonaneurysmal. The IVC is patent. The right kidney was

poorly visualized but no definite hydronephrosis is seen.



IMPRESSION: 

Upper abdominal ascites. Gallbladder wall is thickened which

could be owing to ascites or hepatic dysfunction. No

cholelithiasis is seen. No other significant abnormality is

detected.



Dictated by: 



  Dictated on workstation # [id number]

## 2021-03-23 NOTE — DIAGNOSTIC IMAGING REPORT
INDICATION: Pneumonia



COMPARISON: 03/21/2021



FINDINGS: Single view of the chest demonstrates unchanged

bilateral pulmonary infiltrates. Heart is enlarged. There is no

pneumothorax or large effusion. The right IJ catheter is in good

position.



IMPRESSION: 

1. Unchanged bilateral pulmonary infiltrates.

2. No pneumothorax.



Dictated by: 



  Dictated on workstation # [id number]